# Patient Record
Sex: FEMALE | Race: BLACK OR AFRICAN AMERICAN | Employment: FULL TIME | URBAN - METROPOLITAN AREA
[De-identification: names, ages, dates, MRNs, and addresses within clinical notes are randomized per-mention and may not be internally consistent; named-entity substitution may affect disease eponyms.]

---

## 2017-04-13 ENCOUNTER — APPOINTMENT (OUTPATIENT)
Dept: CT IMAGING | Facility: HOSPITAL | Age: 42
End: 2017-04-13
Attending: EMERGENCY MEDICINE
Payer: COMMERCIAL

## 2017-04-13 ENCOUNTER — APPOINTMENT (OUTPATIENT)
Dept: NUCLEAR MEDICINE | Facility: HOSPITAL | Age: 42
End: 2017-04-13
Attending: EMERGENCY MEDICINE
Payer: COMMERCIAL

## 2017-04-13 ENCOUNTER — HOSPITAL ENCOUNTER (EMERGENCY)
Facility: HOSPITAL | Age: 42
Discharge: HOME | End: 2017-04-13
Attending: EMERGENCY MEDICINE
Payer: COMMERCIAL

## 2017-04-13 ENCOUNTER — APPOINTMENT (OUTPATIENT)
Dept: GENERAL RADIOLOGY | Facility: HOSPITAL | Age: 42
End: 2017-04-13
Attending: EMERGENCY MEDICINE
Payer: COMMERCIAL

## 2017-04-13 VITALS
WEIGHT: 150 LBS | TEMPERATURE: 98 F | BODY MASS INDEX: 26.58 KG/M2 | HEIGHT: 63 IN | OXYGEN SATURATION: 100 % | SYSTOLIC BLOOD PRESSURE: 132 MMHG | DIASTOLIC BLOOD PRESSURE: 79 MMHG | HEART RATE: 65 BPM | RESPIRATION RATE: 18 BRPM

## 2017-04-13 DIAGNOSIS — K86.89 PANCREATIC MASS: ICD-10-CM

## 2017-04-13 DIAGNOSIS — R07.9 ACUTE CHEST PAIN: ICD-10-CM

## 2017-04-13 DIAGNOSIS — S46.912A SHOULDER STRAIN, LEFT, INITIAL ENCOUNTER: Primary | ICD-10-CM

## 2017-04-13 PROCEDURE — 85025 COMPLETE CBC W/AUTO DIFF WBC: CPT | Performed by: EMERGENCY MEDICINE

## 2017-04-13 PROCEDURE — 96374 THER/PROPH/DIAG INJ IV PUSH: CPT

## 2017-04-13 PROCEDURE — 70450 CT HEAD/BRAIN W/O DYE: CPT

## 2017-04-13 PROCEDURE — 80053 COMPREHEN METABOLIC PANEL: CPT | Performed by: EMERGENCY MEDICINE

## 2017-04-13 PROCEDURE — 85610 PROTHROMBIN TIME: CPT | Performed by: EMERGENCY MEDICINE

## 2017-04-13 PROCEDURE — 74177 CT ABD & PELVIS W/CONTRAST: CPT

## 2017-04-13 PROCEDURE — 73030 X-RAY EXAM OF SHOULDER: CPT

## 2017-04-13 PROCEDURE — 78582 LUNG VENTILAT&PERFUS IMAGING: CPT

## 2017-04-13 PROCEDURE — 93010 ELECTROCARDIOGRAM REPORT: CPT

## 2017-04-13 PROCEDURE — 83690 ASSAY OF LIPASE: CPT | Performed by: EMERGENCY MEDICINE

## 2017-04-13 PROCEDURE — 85730 THROMBOPLASTIN TIME PARTIAL: CPT | Performed by: EMERGENCY MEDICINE

## 2017-04-13 PROCEDURE — 73060 X-RAY EXAM OF HUMERUS: CPT

## 2017-04-13 PROCEDURE — 93005 ELECTROCARDIOGRAM TRACING: CPT

## 2017-04-13 PROCEDURE — 96361 HYDRATE IV INFUSION ADD-ON: CPT

## 2017-04-13 PROCEDURE — 99285 EMERGENCY DEPT VISIT HI MDM: CPT

## 2017-04-13 PROCEDURE — 84484 ASSAY OF TROPONIN QUANT: CPT | Performed by: EMERGENCY MEDICINE

## 2017-04-13 PROCEDURE — 71020 XR CHEST PA + LAT CHEST (CPT=71020): CPT

## 2017-04-13 RX ORDER — KETOROLAC TROMETHAMINE 30 MG/ML
30 INJECTION, SOLUTION INTRAMUSCULAR; INTRAVENOUS ONCE
Status: DISCONTINUED | OUTPATIENT
Start: 2017-04-13 | End: 2017-04-13

## 2017-04-13 RX ORDER — ASPIRIN 81 MG/1
324 TABLET, CHEWABLE ORAL ONCE
Status: COMPLETED | OUTPATIENT
Start: 2017-04-13 | End: 2017-04-13

## 2017-04-13 RX ORDER — MECLIZINE HYDROCHLORIDE 25 MG/1
25 TABLET ORAL ONCE
Status: DISCONTINUED | OUTPATIENT
Start: 2017-04-13 | End: 2017-04-13

## 2017-04-13 RX ORDER — SODIUM CHLORIDE 9 MG/ML
1000 INJECTION, SOLUTION INTRAVENOUS ONCE
Status: COMPLETED | OUTPATIENT
Start: 2017-04-13 | End: 2017-04-13

## 2017-04-13 NOTE — ED PROVIDER NOTES
Patient Seen in: [facility] Emergency Department    History   Patient presents with:  Chest Pain Angina (cardiovascular)    Stated Complaint: Chest pain     HPI    Patient is a 44-year-old female who presents emergency room with a history of multipl Take 25 mg by mouth every 6 (six) hours as needed for Itching.    Esomeprazole Magnesium (NEXIUM) 20 MG Oral Capsule Delayed Release,  Take 20 mg by mouth every morning before breakfast.   ibuprofen (MOTRIN) 800 MG Oral Tab,  Take 1 tablet (800 mg total) by appreciated. There is no JVD. No meningeal signs or nuchal rigidity appreciated. No stridor. LUNGS: Clear to auscultation bilaterally with no wheeze. There is good equal air entry bilaterally. HEART: Regular rate and rhythm. Normal S1, S2 no S3, or S4.  Deepthi Ortega ---------                               -----------         ------                     CBC W/ DIFFERENTIAL[[id number]]          Abnormal            Final result                 Please view results for these tests on the individual orders.    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   Patient was offered admission to the hospital at this time for further testing including workup for her heart and also for

## 2017-04-19 PROBLEM — K21.9 GASTROESOPHAGEAL REFLUX DISEASE WITHOUT ESOPHAGITIS: Status: ACTIVE | Noted: 2017-04-19

## 2017-04-19 PROBLEM — K86.89 PANCREATIC MASS: Status: ACTIVE | Noted: 2017-04-19

## 2017-04-19 PROBLEM — Z90.81 H/O SPLENECTOMY: Status: ACTIVE | Noted: 2017-04-19

## 2017-04-19 PROBLEM — D35.2 PITUITARY MICROADENOMA (HCC): Status: ACTIVE | Noted: 2017-04-19

## 2025-06-03 NOTE — ED INITIAL ASSESSMENT (HPI)
C/o on and off L shoulder to L arm pain for a week, dizziness and nausea today.  Pt denies vomiting, denies HIRO, denies recent injury, no c/o HA, no visual changes
Patient requests all Lab, Cardiology, and Radiology Results on their Discharge Instructions

## (undated) NOTE — ED AVS SNAPSHOT
[facility] Emergency Department    [place]  [address] [address]    Phone:  [phone]    Fax:  [phone]           Trey Theodore MD   MRN: [id number]    Department:  [facility] Emergency Department   Date of Visit:  4/ IF THERE IS ANY CHANGE OR WORSENING OF YOUR CONDITION, CALL YOUR PRIMARY CARE PHYSICIAN AT ONCE OR RETURN IMMEDIATELY TO THE EMERGENCY DEPARTMENT.     If you have been prescribed any medication(s), please fill your prescription right away and begin taking t

## (undated) NOTE — ED AVS SNAPSHOT
[facility] Emergency Department    [place]  [address] [address]    Phone:  [phone]    Fax:  [phone]           Andrew Moon MD   MRN: [id number]    Department:  [facility] Emergency Department   Date of Visit:  4/ WITH YOUR GI MD AS WELL AS YOUR SURGEON AT [facility]  RETURN TO THE ED IF ANY OTHER PROBLEMS ARISE    Discharge References/Attachments     CHEST PAIN, UNCERTAIN CAUSE (ENGLISH)    SHOULDER SPRAIN  (ENGLISH)      Disclosure     Insurance plans vary and t IF THERE IS ANY CHANGE OR WORSENING OF YOUR CONDITION, CALL YOUR PRIMARY CARE PHYSICIAN AT ONCE OR RETURN IMMEDIATELY TO THE EMERGENCY DEPARTMENT.     If you have been prescribed any medication(s), please fill your prescription right away and begin taking t [address] to help you get signed up for insurance coverage. [address] is a [employer] program that can help with your Affordable Care Act coverage, as well as all types of Medicaid plans.   To get signed up and covere High density in the soft tissues is likely from extravasated contrast.  IMPRESSION:  High density in the soft tissues is likely which doesn't contrast. Otherwise, unremarkable radiographs of the left shoulder.            Dictated by: Jonathon Peoples MD on PLEURA:  Normal.  No pleural effusions. BONES:  Normal for age. CT BRAIN OR HEAD (19117) (Final result) Result time:  04/13/17 09:08:26    Final result    Impression:    CONCLUSION:  No acute intracranial process. Dictated by:  Selin Friedman Dictated by: Mike Farris MD on 4/13/2017 at 9:08       Approved by: Mike Farris MD              Narrative:    PROCEDURE:  CT ABDOMEN+PELVIS(CONTRAST ONLY)(CPT=74177)     COMPARISON:  None.      INDICATIONS:  Chest pain , ABD PAIN, HISTORY OF \"PRECANCER\" I BONES:  Mild degenerative change of L5-S1. No bony lesion or fracture. LUNG BASES:  Normal.  No visible pulmonary or pleural disease. [employer]     Sign up for [employer], your secure online medical record.   [employer] will allow you to access pa